# Patient Record
Sex: FEMALE | Race: WHITE | NOT HISPANIC OR LATINO | URBAN - METROPOLITAN AREA
[De-identification: names, ages, dates, MRNs, and addresses within clinical notes are randomized per-mention and may not be internally consistent; named-entity substitution may affect disease eponyms.]

---

## 2021-04-29 ENCOUNTER — TELEPHONE (OUTPATIENT)
Dept: OPHTHALMOLOGY | Facility: CLINIC | Age: 66
End: 2021-04-29

## 2021-04-29 NOTE — TELEPHONE ENCOUNTER
Pt confirmed appt for July 19th at 12 PM with Dr. Arteaga-- [address] location on 3rd floor-- [facility].    Pt aware of date/time/location and has main clinic number    Juan C Durham RN 10:29 AM 04/30/21    ---    Thyroid eye clinic at capacity for scheduling til July with review of scheduling with facilitator.    Called pt twice and left message with direct number to confirm scheduling    Tentatively scheduled under Dr. Arteaga on Monday July 19th at 12 PM-- 3rd floor [facility]-- [address] [facility].    Will confirm appt on callback    Juan C Durham RN 4:24 PM 04/29/21

## 2021-04-29 NOTE — TELEPHONE ENCOUNTER
Pt been treated at [place] for Thyroid eye disease and has had several lid/eye surgeries    Pt referred by Dr. Thompson for second opinion with Dr. Arteaga.    Reviewed multidisciplinary thryoid eye disease clinic- with neuro-ophthalmology Dr. Arteaga, oculo-plastics MD and strabismus MD-- clinics couple times a month    Recommended evaluation in thyroid eye clinic and pt agrees    Reviewed first available in July and pt hoping for sooner    Reviewed would ask facilitator to review sooner appt in June or earlier if available    Provided pt with our fax number for previous eye notes/procedures    Note to facilitator to review earlier scheduling    Juan C Durham RN 10:28 AM 04/29/21            Health Call Center    Phone Message    May a detailed message be left on voicemail: yes     Reason for Call: Other: Pt has specific questions about what Dr. Arteaga can treat regarding her Graves Eye Disease. She also was wondering if he could perform an eye lid revision as she had a surgery go wrong. Please call Pt to discuss what Dr. Arteaga offers/what she would be able to be seen for. Pt's current Thyroid Eye MD(Dr. Thompson)  told her to call and inquire. Thanks!     Action Taken: Message routed to:  [facility] EYE    Travel Screening: Not Applicable

## 2021-05-26 ENCOUNTER — RECORDS - HEALTHEAST (OUTPATIENT)
Dept: ADMINISTRATIVE | Facility: CLINIC | Age: 66
End: 2021-05-26

## 2022-10-03 ENCOUNTER — PATIENT OUTREACH (OUTPATIENT)
Dept: ONCOLOGY | Facility: CLINIC | Age: 67
End: 2022-10-03

## 2022-10-03 ENCOUNTER — TRANSCRIBE ORDERS (OUTPATIENT)
Dept: OTHER | Age: 67
End: 2022-10-03

## 2022-10-03 DIAGNOSIS — E56.9 VITAMIN DEFICIENCY: Primary | ICD-10-CM

## 2022-10-03 NOTE — PROGRESS NOTES
Referral received for benign heme services, see below.    Referral reason: [facility], transfer of care from [facility], requesting Dr. Ema España    Current abnormal labs: Available in CareEverywhere    Outreach: Call not placed to patient regarding referral.    Plan: Internal Referral: No additional work-up needed, scheduling instructions updated, referral transferred to [facility] for completion.

## 2022-10-20 NOTE — PROGRESS NOTES
RECORDS STATUS - ALL OTHER DIAGNOSIS    Vitamin deficiency   RECORDS RECEIVED FROM: [facility]/ [place]    DATE RECEIVED: 10/28/2022    Action    Action Taken 10/20/2022 3:05pm EREN    I called [facility] Dept Ph: [phone] option 2    110/25/2022 1:59pm EREN   I resolved imaging from [place] in PACS      NOTES STATUS DETAILS   OFFICE NOTE from referring provider Complete [facility]      CLINICAL TRIAL TREATMENTS TO DATE     LABS     PATHOLOGY REPORTS     ANYTHING RELATED TO DIAGNOSIS Complete [facility]- Labs last updated on 9/12/2022    GENONOMIC TESTING     TYPE:     IMAGING (NEED IMAGES & REPORT)     CT SCANS Complete- [place]  CT Skeletal 9/12/2022   MRI     MAMMO     ULTRASOUND     PET

## 2022-10-28 ENCOUNTER — PRE VISIT (OUTPATIENT)
Dept: ONCOLOGY | Facility: CLINIC | Age: 67
End: 2022-10-28